# Patient Record
Sex: FEMALE | Race: WHITE | NOT HISPANIC OR LATINO | Employment: PART TIME | URBAN - METROPOLITAN AREA
[De-identification: names, ages, dates, MRNs, and addresses within clinical notes are randomized per-mention and may not be internally consistent; named-entity substitution may affect disease eponyms.]

---

## 2021-05-26 ENCOUNTER — HOSPITAL ENCOUNTER (EMERGENCY)
Facility: HOSPITAL | Age: 31
Discharge: HOME | End: 2021-05-26
Attending: EMERGENCY MEDICINE | Admitting: EMERGENCY MEDICINE
Payer: COMMERCIAL

## 2021-05-26 VITALS
TEMPERATURE: 97.9 F | HEART RATE: 54 BPM | WEIGHT: 180 LBS | SYSTOLIC BLOOD PRESSURE: 103 MMHG | RESPIRATION RATE: 15 BRPM | DIASTOLIC BLOOD PRESSURE: 58 MMHG | OXYGEN SATURATION: 100 %

## 2021-05-26 DIAGNOSIS — G40.919 BREAKTHROUGH SEIZURE (HCC): Primary | ICD-10-CM

## 2021-05-26 LAB
ATRIAL RATE: 48 BPM
P AXIS: 37 DEGREES
PR INTERVAL: 136 MS
QRS AXIS: 23 DEGREES
QRSD INTERVAL: 70 MS
QT INTERVAL: 476 MS
QTC INTERVAL: 425 MS
T WAVE AXIS: 19 DEGREES
VENTRICULAR RATE: 48 BPM

## 2021-05-26 PROCEDURE — 93010 ELECTROCARDIOGRAM REPORT: CPT | Performed by: INTERNAL MEDICINE

## 2021-05-26 PROCEDURE — 99285 EMERGENCY DEPT VISIT HI MDM: CPT | Performed by: EMERGENCY MEDICINE

## 2021-05-26 PROCEDURE — 93005 ELECTROCARDIOGRAM TRACING: CPT

## 2021-05-26 PROCEDURE — 99284 EMERGENCY DEPT VISIT MOD MDM: CPT

## 2021-05-26 PROCEDURE — 96365 THER/PROPH/DIAG IV INF INIT: CPT

## 2021-05-26 RX ORDER — LEVETIRACETAM 1000 MG/1
1000 TABLET ORAL 2 TIMES DAILY
Qty: 120 TABLET | Refills: 0 | Status: SHIPPED | OUTPATIENT
Start: 2021-05-26 | End: 2021-07-25

## 2021-05-26 RX ORDER — ACETAMINOPHEN 325 MG/1
975 TABLET ORAL ONCE
Status: COMPLETED | OUTPATIENT
Start: 2021-05-26 | End: 2021-05-26

## 2021-05-26 RX ORDER — IBUPROFEN 400 MG/1
400 TABLET ORAL ONCE
Status: COMPLETED | OUTPATIENT
Start: 2021-05-26 | End: 2021-05-26

## 2021-05-26 RX ADMIN — ACETAMINOPHEN 975 MG: 325 TABLET, FILM COATED ORAL at 09:32

## 2021-05-26 RX ADMIN — LEVETIRACETAM 2000 MG: 100 INJECTION, SOLUTION INTRAVENOUS at 10:25

## 2021-05-26 RX ADMIN — IBUPROFEN 400 MG: 400 TABLET ORAL at 09:33

## 2021-05-26 NOTE — ED ATTENDING ATTESTATION
Final Diagnosis:  1  Breakthrough seizure ([facility] [address] )           Ambrocio Mojica MD, saw and evaluated the patient  All available labs and X-rays were ordered by me or the resident and have been reviewed by myself  I discussed the patient with the resident / non-physician and agree with the resident's / non-physician practitioner's findings and plan as documented in the resident's / non-physician practicitioner's note, except where noted  At this point, I agree with the current assessment done in the ED  I was present during key portions of all procedures performed unless otherwise stated  Chief Complaint   Patient presents with    Seizure - Prior Hx Of     Patient reports a seizure lasting a few minuets today; states that this is her third seizure in the last 8-9 months; post-ictal upon EMS arrival but patient GCS 15 upon arrival into ED     This is a 28 y o  female presenting for evaluation of seizures (diagnosed seizures last year in [place] Sept 2020)  Keppra was started  Taking 500mg BID  She moved to the area, had a third seizure, woke up and EMS and family were around her  She seized for a few minutes (<5 minutes)  Post-ictal but now back at baseline  No abortive meds  No incontinence  Patient for last 2 or 3 days is ill had only 4 hours of sleep each night, discussed sleep deprivation  Denies alcohol, drugs  Denies stimulants  No incontinence  No focal deficits    PMH:   has a past medical history of Seizure ([facility] [address] )  PSH:   has no past surgical history on file      Social:  Social History     Substance and Sexual Activity   Alcohol Use Never    Frequency: Never     Social History     Tobacco Use   Smoking Status Never Smoker   Smokeless Tobacco Never Used     Social History     Substance and Sexual Activity   Drug Use Never     PE:  Vitals:    05/26/21 0903 05/26/21 0915 05/26/21 0930 05/26/21 1000   BP: 119/73 119/73 (!) 86/60 101/54   BP Location: Left arm      Pulse: 96 84 70 (!) 54   Resp: 18 17 20 14   Temp: 97 9 °F (36 6 °C)      TempSrc: Oral      SpO2: 98% 99% 100% 100%   Weight: 81 6 kg (180 lb)      General: VSS, NAD, awake, alert  Well-nourished, well-developed  Appears stated age  Head: Normocephalic, atraumatic, nontender  Eyes: PERRL, EOM-I  No diplopia  No hyphema  No subconjunctival hemorrhages  Symmetrical lids  ENTAtraumatic external nose and ears  MMM  No stridor  Normal phonation  No drooling  Base of mouth is soft  No mastoid tenderness  She does have a tongue bite over the anterior tip as well as left lateral area, nothing is bleeding  Neck: Symmetric, trachea midline  No JVD  CV: Peripheral pulses +2 throughout  No chest wall tenderness  Lungs:   Unlabored   No retractions  No crepitus  No tachypnea  No paradoxical motion  MSK:   FROM   No lower extremity edema  Back:   No CVAT  Skin: Dry, intact  Neuro: AAOx3, GCS 15, CN II-XII grossly intact  Motor grossly intact  Psychiatric/Behavioral: Appropriate mood and affect   Exam: deferred  A:  - Seizure, recurrent   P:  - Given that the patient had a seizure, will do a [place] Initial Reporting Form ([facility] us/Public/DVSPubsForms/BDL/BDL%20Form/DL-13 pdf)  - Patient aware that the patient shouldn't drive until cleared by PCP or neurology  - EKG  - Denies possiblity of pregnancy  - Feels like last 2 seizures  - discussed sleep hygiene    - 13 point ROS was performed and all are normal unless stated in the history above  - Nursing note reviewed  Vitals reviewed  - Orders placed by myself and/or advanced practitioner / resident     - Previous chart was not available to be reviewed  - No language barrier    - History obtained from patient  - There are no limitations to the history obtained  - Critical care time: Not applicable for this patient       Code Status: No Order  Advance Directive and Living Will:      Power of :    POLST:      Medications acetaminophen (TYLENOL) tablet 975 mg (975 mg Oral Given 5/26/21 0932)   ibuprofen (MOTRIN) tablet 400 mg (400 mg Oral Given 5/26/21 0933)   levETIRAcetam (KEPPRA) 2,000 mg in sodium chloride 0 9 % 250 mL IVPB (2,000 mg Intravenous New Bag 5/26/21 1025)     No orders to display     Orders Placed This Encounter   Procedures    ED ECG Documentation Only    EKG RESULTS    ECG 12 lead    ECG 12 lead     Labs Reviewed - No data to display  Time reflects when diagnosis was documented in both MDM as applicable and the Disposition within this note     Time User Action Codes Description Comment    5/26/2021  9:58 AM Lynn Car [G40 919] Breakthrough seizure [facility])       ED Disposition     ED Disposition Condition Date/Time Comment    Discharge Good Wed May 26, 2021  9:58 AM Cheng Coronado discharge to home/self care  Follow-up Information     Follow up With Specialties Details Why Contact Info Additional Hernandez Casew Neurology [facility] Neurology Schedule an appointment as soon as possible for a visit   [address] Neurology [facility], [address] [place] [address], [address] [place] [address]        Patient's Medications   Discharge Prescriptions    LEVETIRACETAM (KEPPRA) 1000 MG TABLET    Take 1 tablet (1,000 mg total) by mouth 2 (two) times a day       Start Date: 5/26/2021 End Date: 7/25/2021       Order Dose: 1,000 mg       Quantity: 120 tablet    Refills: 0     No discharge procedures on file  None       Portions of the record may have been created with voice recognition software  Occasional wrong word or "sound a like" substitutions may have occurred due to the inherent limitations of voice recognition software  Read the chart carefully and recognize, using context, where substitutions have occurred      Electronically signed by:  Ban Gibbons

## 2021-05-26 NOTE — ED PROVIDER NOTES
History  Chief Complaint   Patient presents with    Seizure - Prior Hx Of     Patient reports a seizure lasting a few minuets today; states that this is her third seizure in the last 8-9 months; post-ictal upon EMS arrival but patient GCS 15 upon arrival into ED     HPI   27 yo woman with history of seizure disorder presents to the emergency department for seizure  The patient was diagnosed with her 1st seizure in September 2020  She established care with a neurologist at that time in [facility] where she was living  She was started on Keppra 500 mg once daily  About 3 months ago patient had a 2nd seizure  Her Keppra was increased to 500 mg b i d  She had been seizure-free since that time until this morning  She was at home when she believes she had another seizure  She was found by her family members who described generalized seizure for a couple minutes  No abortive medications were given  Afterwards patient was postictal but has since returned to baseline  She has a mild headache and felt a little nauseated and vomited x1 but no longer feels nauseated  She has no pain in her neck or back  No chest pain or shortness of breath  No abdominal pain or extremity pain  She feels back to baseline  She denies any missed doses of Keppra  Has not been ill recently  She has been sleep deprived  No other new medications  No fever  None       Past Medical History:   Diagnosis Date    Seizure [facility])        History reviewed  No pertinent surgical history  History reviewed  No pertinent family history  I have reviewed and agree with the history as documented  E-Cigarette/Vaping    E-Cigarette Use Never User      E-Cigarette/Vaping Substances     Social History     Tobacco Use    Smoking status: Never Smoker    Smokeless tobacco: Never Used   Substance Use Topics    Alcohol use: Never     Frequency: Never    Drug use: Never        Review of Systems   Constitutional: Negative for chills and fever  Respiratory: Negative for shortness of breath  Cardiovascular: Negative for chest pain  Gastrointestinal: Positive for nausea and vomiting  Negative for abdominal pain  Musculoskeletal: Negative for arthralgias, back pain, neck pain and neck stiffness  Skin: Negative for wound  Neurological: Positive for seizures and headaches  All other systems reviewed and are negative  Physical Exam  ED Triage Vitals [05/26/21 0903]   Temperature Pulse Respirations Blood Pressure SpO2   97 9 °F (36 6 °C) 96 18 119/73 98 %      Temp Source Heart Rate Source Patient Position - Orthostatic VS BP Location FiO2 (%)   Oral Monitor Lying Left arm --      Pain Score       7             Orthostatic Vital Signs  Vitals:    05/26/21 0915 05/26/21 0930 05/26/21 1000 05/26/21 1100   BP: 119/73 (!) 86/60 101/54 103/58   Pulse: 84 70 (!) 54 (!) 54   Patient Position - Orthostatic VS:           Physical Exam  Vitals signs and nursing note reviewed  Constitutional:       General: She is not in acute distress  Appearance: She is well-developed  She is not diaphoretic  HENT:      Head: Normocephalic  Mouth/Throat:      Comments: Very small laceration to tip of tongue  Eyes:      General: No scleral icterus  Conjunctiva/sclera: Conjunctivae normal       Pupils: Pupils are equal, round, and reactive to light  Neck:      Musculoskeletal: Normal range of motion and neck supple  Cardiovascular:      Rate and Rhythm: Normal rate and regular rhythm  Heart sounds: No murmur  No friction rub  No gallop  Pulmonary:      Breath sounds: Normal breath sounds  No wheezing or rales  Abdominal:      General: There is no distension  Palpations: Abdomen is soft  Tenderness: There is no abdominal tenderness  There is no guarding or rebound  Musculoskeletal: Normal range of motion  General: No tenderness  Skin:     General: Skin is warm and dry  Coloration: Skin is not pale        Findings: No erythema  Neurological:      Mental Status: She is alert and oriented to person, place, and time  Cranial Nerves: No cranial nerve deficit  Sensory: No sensory deficit  Motor: No abnormal muscle tone  Psychiatric:         Behavior: Behavior normal          ED Medications  Medications   acetaminophen (TYLENOL) tablet 975 mg (975 mg Oral Given 5/26/21 0932)   ibuprofen (MOTRIN) tablet 400 mg (400 mg Oral Given 5/26/21 0933)   levETIRAcetam (KEPPRA) 2,000 mg in sodium chloride 0 9 % 250 mL IVPB (0 mg Intravenous Stopped 5/26/21 1106)       Diagnostic Studies  Results Reviewed     None                 No orders to display         Procedures  ECG 12 Lead Documentation Only    Date/Time: 5/26/2021 10:05 AM  Performed by: Shine Joiner MD  Authorized by: Shine Joiner MD     Indications / Diagnosis:  Seizure r/o syncope  ECG reviewed by me, the ED Provider: yes    Patient location:  ED  Previous ECG:     Previous ECG:  Unavailable  Interpretation:     Interpretation: non-specific    Rate:     ECG rate:  48    ECG rate assessment: bradycardic    Rhythm:     Rhythm: sinus bradycardia    Ectopy:     Ectopy: none    QRS:     QRS axis:  Normal    QRS intervals:  Normal  Conduction:     Conduction: normal    ST segments:     ST segments:  Normal  T waves:     T waves: normal            ED Course  ED Course as of May 26 1733   Wed May 26, 2021   [address] [facility] Rosaline Norton from neurology reocmmends 2 g Keppra load now and increasing to 1 g BID                                SBIRT 22yo+      Most Recent Value   SBIRT (23 yo +)   In order to provide better care to our patients, we are screening all of our patients for alcohol and drug use  Would it be okay to ask you these screening questions?   No Filed at: 05/26/2021 1011                MDM  Number of Diagnoses or Management Options  Breakthrough seizure ([facility] [address] ): new and does not require workup     Amount and/or Complexity of Data Reviewed  Discuss the patient with other providers: yes    Patient Progress  Patient progress: improved     16-year-old woman with known seizure disorder with breakthrough seizure  Patient is awake, alert, GCS 15  She has no external signs of trauma other than very small tongue laceration and no areas of tenderness to palpation  Seizure probably provoked from sleep deprivation but she is still on a relatively low dose of Keppra  The patient says that she has recently moved to the area has not yet established care with a local neurologist   I discussed patient with on-call neurologist Dr Rosaline Norton, who recommended Keppra load 2 g and increasing daily dose to 1 g BID     - Given that the patient had a seizure, will do a [place] Initial Reporting Form ([facility] us/Public/DVSPubsForms/BDL/BDL%20Form/DL-13 pdf)  - Patient aware that the patient shouldn't drive until cleared by PCP or neurology  Neurology clinic will contact her for follow-up appointment  Disposition  Final diagnoses:   Breakthrough seizure ([address] )     Time reflects when diagnosis was documented in both MDM as applicable and the Disposition within this note     Time User Action Codes Description Comment    5/26/2021  9:58 AM Jerrica Arndt Add [G40 919] Breakthrough seizure [facility])       ED Disposition     ED Disposition Condition Date/Time Comment    Discharge Good Wed May 26, 2021  9:58 AM Jose Kaufman discharge to home/self care              Follow-up Information     Follow up With Specialties Details Why Contact Info Additional Hernandez Singh Neurology [facility] Neurology Schedule an appointment as soon as possible for a visit   [facility] [address] [facility] Leonila Christensen Neurology [facility], [address] [place] [address], [address] [facility]          Discharge Medication List as of 5/26/2021 10:47 AM      START taking these medications    Details   levETIRAcetam (KEPPRA) 1000 MG tablet Take 1 tablet (1,000 mg total) by mouth 2 (two) times a day, Starting Wed 5/26/2021, Until Sun 7/25/2021, Print           No discharge procedures on file  PDMP Review     None           ED Provider  Attending physically available and evaluated Dois Shweta [employer] managed the patient along with the ED Attending      Electronically Signed by         Hollie De La Cruz MD  05/26/21 5800

## 2021-05-26 NOTE — Clinical Note
Jean Buckner was seen and treated in our emergency department on 5/26/2021  Diagnosis:     Abhishek Beltran  may return to work on return date  She may return on this date: 05/27/2021         If you have any questions or concerns, please don't hesitate to call        Nael Dietz MD    ______________________________           _______________          _______________  Hospital Representative                              Date                                Time